# Patient Record
Sex: MALE | Race: WHITE | NOT HISPANIC OR LATINO | Employment: FULL TIME | URBAN - NONMETROPOLITAN AREA
[De-identification: names, ages, dates, MRNs, and addresses within clinical notes are randomized per-mention and may not be internally consistent; named-entity substitution may affect disease eponyms.]

---
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
## 2021-02-25 ENCOUNTER — OFFICE VISIT (OUTPATIENT)
Dept: FAMILY MEDICINE CLINIC | Facility: CLINIC | Age: 21
End: 2021-02-25

## 2021-02-25 VITALS
RESPIRATION RATE: 14 BRPM | HEIGHT: 72 IN | HEART RATE: 91 BPM | DIASTOLIC BLOOD PRESSURE: 66 MMHG | SYSTOLIC BLOOD PRESSURE: 120 MMHG | BODY MASS INDEX: 22.21 KG/M2 | OXYGEN SATURATION: 98 % | WEIGHT: 164 LBS | TEMPERATURE: 98 F

## 2021-02-25 DIAGNOSIS — L03.116 CELLULITIS OF LEFT LOWER EXTREMITY: Primary | ICD-10-CM

## 2021-02-25 DIAGNOSIS — S61.411A LACERATION OF RIGHT HAND WITHOUT FOREIGN BODY, INITIAL ENCOUNTER: ICD-10-CM

## 2021-02-25 PROCEDURE — 90471 IMMUNIZATION ADMIN: CPT | Performed by: NURSE PRACTITIONER

## 2021-02-25 PROCEDURE — 90715 TDAP VACCINE 7 YRS/> IM: CPT | Performed by: NURSE PRACTITIONER

## 2021-02-25 PROCEDURE — 99203 OFFICE O/P NEW LOW 30 MIN: CPT | Performed by: NURSE PRACTITIONER

## 2021-02-25 RX ORDER — SULFAMETHOXAZOLE AND TRIMETHOPRIM 800; 160 MG/1; MG/1
1 TABLET ORAL 2 TIMES DAILY
Qty: 20 TABLET | Refills: 0 | Status: SHIPPED | OUTPATIENT
Start: 2021-02-25 | End: 2021-04-29

## 2021-02-25 NOTE — PROGRESS NOTES
"CC: cellulitis on leg    History:  Armond Armstrong is a 21 y.o. male who presents today for evaluation of the above problems.      Developed a \"pimple\" on his leg 3 days ago. States that he busted it and expressed purulent drainage, however, the lesion has continued to swell and become increasingly erythematous and painful. 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   In addition to this, he cut his finger about a week ago on sheet medal.  His mother states that it probably should have had stitches. Tetanus is not up to date.     HPI  ROS:  Review of Systems   Constitutional: Negative for fever.   Gastrointestinal: Negative for diarrhea and nausea.   Skin: Positive for color change and wound.       No Known Allergies  Past Medical History:   Diagnosis Date   • Head injuries 04/07/2016    baseball hit to right temporal area/eye socket   • Headache    • Hx MRSA infection      Past Surgical History:   Procedure Laterality Date   • TONSILLECTOMY       Family History   Problem Relation Age of Onset   • No Known Problems Mother    • No Known Problems Father    • No Known Problems Sister       reports that he has been smoking. He has never used smokeless tobacco. He reports current alcohol use. He reports that he does not use drugs.      Current Outpatient Medications:   •  sulfamethoxazole-trimethoprim (Bactrim DS) 800-160 MG per tablet, Take 1 tablet by mouth 2 (Two) Times a Day., Disp: 20 tablet, Rfl: 0    OBJECTIVE:  /66 (BP Location: Left arm, Patient Position: Sitting, Cuff Size: Adult)   Pulse 91   Temp 98 °F (36.7 °C) (Temporal)   Resp 14   Ht 182.9 cm (72\")   Wt 74.4 kg (164 lb)   SpO2 98%   BMI 22.24 kg/m²    Physical Exam  Vitals signs reviewed.   Constitutional:       Appearance: He is well-developed.   Cardiovascular:      Rate and Rhythm: Normal rate.   Pulmonary:      Effort: Pulmonary effort is normal.   Skin:         Neurological:      Mental " Status: He is alert and oriented to person, place, and time.   Psychiatric:         Behavior: Behavior normal.         Assessment/Plan    Diagnoses and all orders for this visit:    1. Cellulitis of left lower extremity (Primary)  -     sulfamethoxazole-trimethoprim (Bactrim DS) 800-160 MG per tablet; Take 1 tablet by mouth 2 (Two) Times a Day.  Dispense: 20 tablet; Refill: 0    2. Laceration of right hand without foreign body, initial encounter  -     Tdap Vaccine Greater Than or Equal To 6yo IM    Bactrim to cover potential MRSA. Advised to take with food. Warm moist compresses for 20 minutes 3 times a day to encourage drainage. Tetanus booster due to injury.    An After Visit Summary was printed and given to the patient at discharge.  Return in about 1 week (around 3/4/2021) for Recheck.       ADÁN Chirinos 2/25/21    Electronically signed.

## 2021-04-19 ENCOUNTER — OFFICE VISIT (OUTPATIENT)
Dept: FAMILY MEDICINE CLINIC | Facility: CLINIC | Age: 21
End: 2021-04-19

## 2021-04-19 VITALS
HEART RATE: 91 BPM | WEIGHT: 158 LBS | RESPIRATION RATE: 18 BRPM | SYSTOLIC BLOOD PRESSURE: 120 MMHG | DIASTOLIC BLOOD PRESSURE: 72 MMHG | TEMPERATURE: 98 F | OXYGEN SATURATION: 99 % | BODY MASS INDEX: 21.4 KG/M2 | HEIGHT: 72 IN

## 2021-04-19 DIAGNOSIS — L73.9 FOLLICULITIS: Primary | ICD-10-CM

## 2021-04-19 PROCEDURE — 99213 OFFICE O/P EST LOW 20 MIN: CPT | Performed by: FAMILY MEDICINE

## 2021-04-19 RX ORDER — CLINDAMYCIN HYDROCHLORIDE 300 MG/1
CAPSULE ORAL
Qty: 60 CAPSULE | Refills: 0 | Status: SHIPPED | OUTPATIENT
Start: 2021-04-19 | End: 2021-09-08

## 2021-04-19 NOTE — PROGRESS NOTES
Subjective   Armond Armstrong is a 21 y.o. male.     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 The current episode started in the past 7 days. The problem occurs daily. Associated symptoms include a rash. Associated symptoms comments: Recurrent cellulitis left leg-had MRSA a few weeks ago that required I&D. Treatments tried: On Bactrim. The treatment provided no relief.       The following portions of the patient's history were reviewed and updated as appropriate: allergies, current medications, past family history, past medical history, past social history, past surgical history and problem list.    Review of Systems   Skin: Positive for rash.        Folliculitis left leg some drainage       Objective   Physical Exam  Vitals and nursing note reviewed.   Constitutional:       Appearance: Normal appearance.   Skin:     Findings: Erythema and rash present.      Comments: Folliculitis left leg laterally-surgical scar present from previous I&D related to MRSA   Neurological:      General: No focal deficit present.      Mental Status: He is alert and oriented to person, place, and time.   Psychiatric:         Mood and Affect: Mood normal.         Thought Content: Thought content normal.         Judgment: Judgment normal.         Assessment/Plan   Diagnoses and all orders for this visit:    1. Folliculitis (Primary)    Other orders  -     mupirocin (BACTROBAN) 2 % nasal ointment; into the nostril(s) as directed by provider Daily.  Dispense: 1 each; Refill: 3  -     clindamycin (CLEOCIN) 300 MG capsule; 2 caps 3 times daily with food and water  Dispense: 60 capsule; Refill: 0       Plan above plus family should use Bactroban-return 10 days may require additional treatment

## 2021-04-29 ENCOUNTER — OFFICE VISIT (OUTPATIENT)
Dept: FAMILY MEDICINE CLINIC | Facility: CLINIC | Age: 21
End: 2021-04-29

## 2021-04-29 VITALS
OXYGEN SATURATION: 99 % | DIASTOLIC BLOOD PRESSURE: 82 MMHG | SYSTOLIC BLOOD PRESSURE: 126 MMHG | TEMPERATURE: 98.6 F | BODY MASS INDEX: 21.94 KG/M2 | HEIGHT: 72 IN | HEART RATE: 68 BPM | WEIGHT: 162 LBS | RESPIRATION RATE: 18 BRPM

## 2021-04-29 DIAGNOSIS — L73.9 FOLLICULITIS: Primary | ICD-10-CM

## 2021-04-29 PROCEDURE — 99213 OFFICE O/P EST LOW 20 MIN: CPT | Performed by: FAMILY MEDICINE

## 2021-04-29 RX ORDER — DOXYCYCLINE 100 MG/1
100 TABLET ORAL DAILY
Qty: 20 TABLET | Refills: 0 | Status: SHIPPED | OUTPATIENT
Start: 2021-04-29 | End: 2021-09-08

## 2021-04-29 NOTE — PROGRESS NOTES
Subjective   Armond Armstrong is a 21 y.o. male.     21-year-old male with folliculitis left leg history of MRSA      The following portions of the patient's history were reviewed and updated as appropriate: allergies, current medications, past family history, past medical history, past social history, past surgical history and problem list.    Review of Systems   HENT:        Using Bactroban intranasally at night for a month   Skin:        No new skin lesions       Objective   Physical Exam  Vitals and nursing note reviewed.   Constitutional:       Appearance: Normal appearance.   Skin:     Findings: No erythema or rash.      Comments: Left leg-no new folliculitis or skin lesions-plan continue Bactroban and above   Neurological:      General: No focal deficit present.      Mental Status: He is alert and oriented to person, place, and time.   Psychiatric:         Mood and Affect: Mood normal.         Behavior: Behavior normal.         Thought Content: Thought content normal.         Judgment: Judgment normal.         Assessment/Plan   Diagnoses and all orders for this visit:    1. Folliculitis (Primary)    Other orders  -     doxycycline (ADOXA) 100 MG tablet; Take 1 tablet by mouth Daily.  Dispense: 20 tablet; Refill: 0         Plan above

## 2021-09-08 ENCOUNTER — OFFICE VISIT (OUTPATIENT)
Dept: FAMILY MEDICINE CLINIC | Facility: CLINIC | Age: 21
End: 2021-09-08

## 2021-09-08 VITALS
DIASTOLIC BLOOD PRESSURE: 84 MMHG | WEIGHT: 161.4 LBS | OXYGEN SATURATION: 98 % | TEMPERATURE: 98.4 F | SYSTOLIC BLOOD PRESSURE: 135 MMHG | HEART RATE: 72 BPM | BODY MASS INDEX: 22.6 KG/M2 | HEIGHT: 71 IN

## 2021-09-08 DIAGNOSIS — J06.9 UPPER RESPIRATORY TRACT INFECTION, UNSPECIFIED TYPE: Primary | ICD-10-CM

## 2021-09-08 PROCEDURE — 99213 OFFICE O/P EST LOW 20 MIN: CPT | Performed by: FAMILY MEDICINE

## 2021-09-08 RX ORDER — AZITHROMYCIN 250 MG/1
TABLET, FILM COATED ORAL
Qty: 6 TABLET | Refills: 0 | Status: SHIPPED | OUTPATIENT
Start: 2021-09-08

## 2021-09-08 NOTE — PROGRESS NOTES
Subjective   Armond Armstrong is a 21 y.o. male.     21-year-old male with history of a sore throat cough congestion      The following portions of the patient's history were reviewed and updated as appropriate: allergies, current medications, past family history, past medical history, past social history, past surgical history and problem list.    Review of Systems   HENT: Positive for sinus pain and sore throat.    Respiratory: Positive for cough and shortness of breath.    Cardiovascular: Positive for chest pain.   Gastrointestinal: Positive for diarrhea.       Objective   Physical Exam  Vitals and nursing note reviewed.   Constitutional:       Appearance: Normal appearance.   HENT:      Right Ear: Tympanic membrane and ear canal normal.      Left Ear: Tympanic membrane and ear canal normal.      Mouth/Throat:      Mouth: Mucous membranes are moist.      Pharynx: Oropharynx is clear.   Cardiovascular:      Rate and Rhythm: Normal rate and regular rhythm.      Pulses: Normal pulses.      Heart sounds: Normal heart sounds.   Pulmonary:      Effort: Pulmonary effort is normal.      Breath sounds: Normal breath sounds.   Musculoskeletal:      Right lower leg: No edema.      Left lower leg: No edema.   Lymphadenopathy:      Cervical: Cervical adenopathy present.   Neurological:      General: No focal deficit present.      Mental Status: He is alert and oriented to person, place, and time.   Psychiatric:         Mood and Affect: Mood normal.         Behavior: Behavior normal.         Thought Content: Thought content normal.         Judgment: Judgment normal.         Assessment/Plan   Diagnoses and all orders for this visit:    1. Upper respiratory tract infection, unspecified type (Primary)  -     COVID-19,[facility] ROUTINE, NP/OP SWAB IN TRANSPORT MEDIA OR ESWAB 72 HR TAT - Swab, Oropharynx    Other orders  -     azithromycin (Zithromax Z-Kareem) 250 MG tablet; Take 2 tablets the first day, then 1 tablet daily for 4 days.   Dispense: 6 tablet; Refill: 0       Plan above-has not been vaccinated

## 2021-09-09 LAB
LABCORP SARS-COV-2, NAA 2 DAY TAT: NORMAL
SARS-COV-2 RNA RESP QL NAA+PROBE: NOT DETECTED